# Patient Record
Sex: FEMALE | Race: WHITE
[De-identification: names, ages, dates, MRNs, and addresses within clinical notes are randomized per-mention and may not be internally consistent; named-entity substitution may affect disease eponyms.]

---

## 2018-05-01 ENCOUNTER — HOSPITAL ENCOUNTER (OUTPATIENT)
Dept: HOSPITAL 89 - LAB | Age: 43
End: 2018-05-01
Attending: INTERNAL MEDICINE
Payer: MEDICAID

## 2018-05-01 DIAGNOSIS — I10: ICD-10-CM

## 2018-05-01 DIAGNOSIS — M25.761: Primary | ICD-10-CM

## 2018-05-01 DIAGNOSIS — E03.9: ICD-10-CM

## 2018-05-01 DIAGNOSIS — E53.8: ICD-10-CM

## 2018-05-01 DIAGNOSIS — E55.9: ICD-10-CM

## 2018-05-01 LAB
LDLC SERPL-MCNC: 76 MG/DL
PLATELET COUNT, AUTOMATED: 335 K/UL (ref 150–450)

## 2018-05-01 PROCEDURE — 84450 TRANSFERASE (AST) (SGOT): CPT

## 2018-05-01 PROCEDURE — 82435 ASSAY OF BLOOD CHLORIDE: CPT

## 2018-05-01 PROCEDURE — 82310 ASSAY OF CALCIUM: CPT

## 2018-05-01 PROCEDURE — 84075 ASSAY ALKALINE PHOSPHATASE: CPT

## 2018-05-01 PROCEDURE — 82465 ASSAY BLD/SERUM CHOLESTEROL: CPT

## 2018-05-01 PROCEDURE — 83718 ASSAY OF LIPOPROTEIN: CPT

## 2018-05-01 PROCEDURE — 82565 ASSAY OF CREATININE: CPT

## 2018-05-01 PROCEDURE — 84443 ASSAY THYROID STIM HORMONE: CPT

## 2018-05-01 PROCEDURE — 84460 ALANINE AMINO (ALT) (SGPT): CPT

## 2018-05-01 PROCEDURE — 84132 ASSAY OF SERUM POTASSIUM: CPT

## 2018-05-01 PROCEDURE — 36415 COLL VENOUS BLD VENIPUNCTURE: CPT

## 2018-05-01 PROCEDURE — 84478 ASSAY OF TRIGLYCERIDES: CPT

## 2018-05-01 PROCEDURE — 84155 ASSAY OF PROTEIN SERUM: CPT

## 2018-05-01 PROCEDURE — 82247 BILIRUBIN TOTAL: CPT

## 2018-05-01 PROCEDURE — 82040 ASSAY OF SERUM ALBUMIN: CPT

## 2018-05-01 PROCEDURE — 85025 COMPLETE CBC W/AUTO DIFF WBC: CPT

## 2018-05-01 PROCEDURE — 84520 ASSAY OF UREA NITROGEN: CPT

## 2018-05-01 PROCEDURE — 82607 VITAMIN B-12: CPT

## 2018-05-01 PROCEDURE — 82947 ASSAY GLUCOSE BLOOD QUANT: CPT

## 2018-05-01 PROCEDURE — 84295 ASSAY OF SERUM SODIUM: CPT

## 2018-05-01 PROCEDURE — 82374 ASSAY BLOOD CARBON DIOXIDE: CPT

## 2018-05-01 PROCEDURE — 82306 VITAMIN D 25 HYDROXY: CPT

## 2018-05-01 NOTE — RADIOLOGY IMAGING REPORT
FACILITY: [facility] - [place] 

 

PATIENT NAME: Lisa Posey

: 1975

MR: [id number]

V: [id number]

EXAM DATE: 

ORDERING PHYSICIAN: MOSES ETIENNE

TECHNOLOGIST: 

 

[place] [facility] [place]

Patient: Lisa Posey

: 1975

MRN: [id number]

Visit/Account:[id number]

Date of Sevice:  2018

 

ACCESSION #: [id number]

 

Technique: KNEE 3 VIEW RIGHT

 

HISTORY: right knee pain

 

Comparison studies:  None

 

FINDINGS: There is no acute fracture.  The alignment of the right knee is maintained.  There is spurr
ing of the medial tibial spine.  Slight patellofemoral osteophytosis is also noted.  No significant k
nee joint effusion.

 

IMPRESSION:

1.  Mild degenerative changes as described above.

 

Report Dictated By: Francis Richardson DO at 2018 12:32 PM

 

Report E-Signed By: Francis Richardson DO  at 2018 12:35 PM

 

WSN:[id number]

## 2018-05-02 ENCOUNTER — HOSPITAL ENCOUNTER (EMERGENCY)
Dept: HOSPITAL 89 - ER | Age: 43
Discharge: HOME | End: 2018-05-02
Payer: MEDICAID

## 2018-05-02 VITALS — SYSTOLIC BLOOD PRESSURE: 126 MMHG | DIASTOLIC BLOOD PRESSURE: 83 MMHG

## 2018-05-02 DIAGNOSIS — G43.909: Primary | ICD-10-CM

## 2018-05-02 PROCEDURE — 96372 THER/PROPH/DIAG INJ SC/IM: CPT

## 2018-05-02 PROCEDURE — 99283 EMERGENCY DEPT VISIT LOW MDM: CPT

## 2018-05-02 NOTE — ER REPORT
History and Physical


Time Seen By MD:  12:30


Hx. of Stated Complaint:  


Migraine for 3 days.  Took fiorinal and codeien, and took her mothers dilaudid


HPI/ROS


 CHIEF COMPLAINT: Migraine








HISTORY OF PRESENT ILLNESS: 43-year-old female returns to the emergency 

department today with a complaint of a migraine headache patient has been seen 

before for a migraine since she's been out of her prophylactic medication to 

insurance restrictions so primary care yesterday where he introduced her 

medications to her less than she was seen here she received medications she 

taken her mother's Percocets for the neurologist is better taking narcotics for 

her headache have no verification of this she's has not seen neurologist for 

over 3 years has not had follow-up for over 3 years this is a versus primary 

care doc she said in the last year or so patient denies any nausea vomiting 

diarrhea seen unilateral left-sided headache no photophobia and no phonophobia 

no neck pain or discomfort except some muscular tenderness in the left 

trapezial area otherwise unremarkable no additional complaints noted





REVIEW OF SYSTEMS:


Respiratory: No cough, no dyspnea.


Cardiovascular: No chest pain, no palpitations.


Gastrointestinal: No vomiting, no abdominal pain.


Musculoskeletal: No back pain.


Remainder of the 14 system rev:  Yes


Allergies:  


Coded Allergies:  


     No Known Drug Allergies (Unverified , 5/2/18)


Home Meds


Active Scripts


Codeine/Butalbital/Asa/Caffein (FIORINAL WITH CODEINE #3 CAP) 1 Each Capsule, 1 

EACH PO Q4H for HEADACHE, #90 CAPSULE 3 Refills


   Prov:MOSES ETIENNE MD         5/1/18


Topiramate (TOPAMAX) 100 Mg Tablet, 100 MG PO QHS, #90 TAB 3 Refills


   Prov:MOSES ETIENNE MD         5/1/18


Hydrochlorothiazide (HYDROCHLOROTHIAZIDE) 25 Mg Tablet, 1 TAB PO QDAY, #90 TAB 

3 Refills


   Prov:MOSES ETIENNE MD         5/1/18


Reported Medications


Propranolol Hcl (PROPRANOLOL HCL) 40 Mg Tablet, 40 MG PO


   12/21/17


Discontinued Reported Medications


Topiramate (TOPAMAX) 100 Mg Tablet, 100 MG PO


   12/21/17


Reviewed Nurses Notes:  Yes


Old Medical Records Reviewed:  Yes


Smoking Status:  Current: Some Days Smoker


Exposure to Second Hand Smoke?:  Yes (both parents smoked)


Hx Substance Use Disorder:  No


Hx Alcohol Use:  No


Constitutional





Vital Sign - Last 24 Hours








 5/2/18 5/2/18





 12:34 13:49


 


Temp 97.5 


 


Pulse 98 


 


Resp 16 


 


B/P (MAP) 142/96 


 


Pulse Ox 91 


 


O2 Delivery Room Air 


 


O2 Flow Rate  15.0








Physical Exam


  General Appearance: The patient is alert, has no immediate need for airway 

protection and no current signs of toxicity. Emotional


Eyes: Pupils equal and round no injection.


Respiratory: Chest is non tender, lungs are clear to auscultation.


Cardiac: regular rate and rhythm [ ]


Gastrointestinal: Abdomen is soft and non tender, no masses, bowel sounds 

normal.


Musculoskeletal:  Neck: Neck is supple and non tender.


   Extremities have full range of motion and are non tender.


Skin: No rashes or lesions.


[ ]


DIFFERENTIAL DIAGNOSIS: After history and physical exam differential diagnosis 

was considered for migraine headache and cluster headache tension headache 

anxiety





Medical Decision Making


ED Course/Re-evaluation


ED Course


Clinical course medical decision-making 43-year-old female comes emergency 

Department today with a typical migraine gave her Benadryl and Reglan Toradol 

Ativan she feels significantly better pain is now down to a 3-4 patient's 

asking to go home she is following with her primary care tomorrow she did ask 

about Ativan on a regular basis I did consider this probably something hurt her 

primary care should discuss patient is agreeable to that plan patient is all of 

her other medications or any prescribed and will diagnosed with migraine


Decision to Disposition Date:  May 2, 2018


Decision to Disposition Time:  14:38





Depart


Departure


Latest Vital Signs





Vital Signs








  Date Time  Temp Pulse Resp B/P (MAP) Pulse Ox O2 Delivery O2 Flow Rate FiO2


 


5/2/18 13:49       15.0 


 


5/2/18 12:34 97.5 98 16 142/96 91 Room Air  








Impression:  


 Primary Impression:  


 Migraine


Condition:  Improved


Disposition:  HOME OR SELF-CARE


Referrals:  


MOSES ETIENNE MD (PCP)


1 Day


Patient Instructions:  Migraine Headache (ED)











ASUNCION MOSS MD May 2, 2018 13:11

## 2018-09-15 ENCOUNTER — HOSPITAL ENCOUNTER (EMERGENCY)
Dept: HOSPITAL 89 - ER | Age: 43
LOS: 1 days | Discharge: HOME | End: 2018-09-16
Payer: MEDICAID

## 2018-09-15 DIAGNOSIS — G43.909: Primary | ICD-10-CM

## 2018-09-15 PROCEDURE — 96361 HYDRATE IV INFUSION ADD-ON: CPT

## 2018-09-15 PROCEDURE — 96374 THER/PROPH/DIAG INJ IV PUSH: CPT

## 2018-09-15 PROCEDURE — 96375 TX/PRO/DX INJ NEW DRUG ADDON: CPT

## 2018-09-15 PROCEDURE — 99284 EMERGENCY DEPT VISIT MOD MDM: CPT

## 2018-09-15 NOTE — ER REPORT
History and Physical


Time Seen By MD:  22:39


HPI/ROS


CHIEF COMPLAINT: migraine headache





HISTORY OF PRESENT ILLNESS: This is a 43 year old female. She has a history of 


migraine headaches. She has had a daily headache for the last 5 days or so. 


Takes Topamax and Propranolol to try to prevent headaches. Has photophobia and 


nausea. No numbness or weakness. Has been under a lot of stress recently, which 


often makes her migraines more frequent and more severe. No fevers or chills. No


recent illness.


Allergies:  


Coded Allergies:  


     No Known Drug Allergies (Unverified , 9/15/18)


Home Meds


Active Scripts


Duloxetine Hcl (CYMBALTA) 60 Mg Capsule.dr, 60 MG PO QDAY, #90 CAP 1 Refill


   Prov:MOSES ETIENNE MD         6/25/18


Cyclobenzaprine Hcl (CYCLOBENZAPRINE HCL) 10 Mg Tablet, 10 MG PO QHS, #90 TAB 3 


Refills


   Prov:MOSES ETIENNE MD         5/2/18


Propranolol Hcl (INDERAL LA) 60 Mg Cap.sa.24h, 60 MG PO Q24H, #90 TAB 3 Refills


   Prov:MOSES ETIENNE MD         5/2/18


Codeine/Butalbital/Asa/Caffein (FIORINAL WITH CODEINE #3 CAP) 1 Each Capsule, 1 


EACH PO Q4H for HEADACHE, #90 CAPSULE 3 Refills


   Prov:MOSES ETIENNE MD         5/1/18


Topiramate (TOPAMAX) 100 Mg Tablet, 100 MG PO QHS, #90 TAB 3 Refills


   Prov:MOSES ETIENNE MD         5/1/18


Hydrochlorothiazide (HYDROCHLOROTHIAZIDE) 25 Mg Tablet, 1 TAB PO QDAY, #90 TAB 3


Refills


   Prov:MOSES ETIENNE MD         5/1/18


Reviewed Nurses Notes:  Yes


Smoking Status:  Current: Some Days Smoker


Exposure to Second Hand Smoke?:  Yes (both parents smoked)


Hx Substance Use Disorder:  No


Hx Alcohol Use:  No


Constitutional





Vital Sign - Last 24 Hours








 9/15/18 9/15/18 9/16/18





 22:44 23:28 00:13


 


Temp 98.2  


 


Pulse 106  85


 


Resp 16  16


 


B/P (MAP) 135/97  138/88 (105)


 


Pulse Ox 92  96


 


O2 Delivery Room Air  Room Air


 


O2 Flow Rate  2.0 














Intake and Output   


 


 9/15/18 9/15/18 9/16/18





 15:00 23:00 07:00


 


Intake Total   800 ml


 


Balance   800 ml








Physical Exam


  General Appearance: Alert, acute distress because of headache pain and nausea.


 


Eyes: Pupils equal and round no injection. Extraocular movements intact. 


Reactive to light. Photophobia.


ENT: Normal oral mucosa. Moist mucous membranes.


Neck: Neck is supple and non tender.


Respiratory: Chest is non tender, lungs are clear to auscultation.


Cardiac: regular rate and rhythm 


Gastrointestinal: Abdomen is soft and non tender, no masses, bowel sounds 


normal.


Musculoskeletal: Extremities have full range of motion. Non tender.


Skin: No rashes or lesions.


Neuro: Normal motor function, no deficits noted alert and oriented 3





DIFFERENTIAL DIAGNOSIS: After history and physical exam differential diagnosis 


was considered for migraine headache





Medical Decision Making


ED Course/Re-evaluation


Clinical Indication for ER IV:  Hydration, IV Access


ED Course


Improved with Reglan 10 mg IV, Benadryl 50 mg IV, and Toradol 15 mg IV. Also 


given a liter of normal saline. Still slow bit of pain afterwards and we did 


give her 0.5 mg Ativan tablet to help with stress as well as pain.


Decision to Disposition Date:  Sep 16, 2018


Decision to Disposition Time:  00:03





Depart


Departure


Latest Vital Signs





Vital Signs








  Date Time  Temp Pulse Resp B/P (MAP) Pulse Ox O2 Delivery O2 Flow Rate FiO2


 


9/16/18 00:13  85 16 138/88 (105) 96 Room Air  


 


9/15/18 23:28       2.0 


 


9/15/18 22:44 98.2       








Impression:  


   Primary Impression:  


   Migraine


Condition:  Improved


Disposition:  HOME OR SELF-CARE


Referrals:  


MOSES ETIENNE MD (PCP)


Patient Instructions:  Migraine Headache (ED)





Additional Instructions:  


No changes in current medications.


Rest and increase fluids over the next 24 hours.





Problem Qualifiers








   Primary Impression:  


   Migraine


   Migraine type:  unspecified  Status migrainosus presence:  without status 


   migrainosus  Intractability:  not intractable  Qualified Codes:  G43.909 - 


   Migraine, unspecified, not intractable, without status migrainosus








JEREMY MCINTYRE MD             Sep 15, 2018 22:39

## 2018-09-16 VITALS — DIASTOLIC BLOOD PRESSURE: 88 MMHG | SYSTOLIC BLOOD PRESSURE: 138 MMHG
